# Patient Record
(demographics unavailable — no encounter records)

---

## 2025-06-11 NOTE — HISTORY OF PRESENT ILLNESS
[Snoring] : snoring [Awakes Unrefreshed] : awakening unrefreshed [Daytime Somnolence] : daytime somnolence [FreeTextEntry1] : 37M PMHx obesity who presents for initial sleep evaluation.  Presents today with symptoms of snoring and EDS.  Reports undergoing home sleep test about 5 years ago, pre-covid, however was never able to follow up on the results. Had some weight loss 2 years ago with less severe symptoms but had gained some weight back and has been stable for the past year. He wakes about once a night. He typically goes to bed around 00:00 and wakes at 07:30-08:00 but there are times he needs to get up as early as 03:00 to work. He works as a kosher advisor. Denies smoking or etoh use. He denies any chronic medical conditions and does not take medications regularly. [Witnessed Apneas] : no witnessed sleep apnea [Frequent Nocturnal Awakening] : no nocturnal awakening [Awakes with Headache] : no headache upon awakening [Awakening With Dry Mouth] : no dry mouth upon awakening [ESS] : 13

## 2025-06-11 NOTE — ASSESSMENT
[FreeTextEntry1] : 37M PMHx obesity who presents for initial sleep evaluation.  #suspected CARLOS Symptoms include daytime somnolence (ESS 13), snoring, non restorative sleep, and nocturnal awakenings - Discussed risks of untreated CARLOS and available treatment options - will be referred for HST with watchpat for evaluation of CARLOS - RTC pending results  Follow up after HST

## 2025-06-11 NOTE — REVIEW OF SYSTEMS
[Negative] : Psychiatric [EDS: ESS=____] : daytime somnolence: ESS=[unfilled] [Snoring] : snoring [Witnessed Apneas] : no witnessed apnea

## 2025-06-11 NOTE — PHYSICAL EXAM
[General Appearance - Well Developed] : well developed [General Appearance - Well Nourished] : well nourished [General Appearance - In No Acute Distress] : no acute distress [Normal Conjunctiva] : the conjunctiva exhibited no abnormalities [Neck Appearance] : the appearance of the neck was normal [Heart Rate And Rhythm] : heart rate was normal and rhythm regular [Heart Sounds] : normal S1 and S2 [Respiration, Rhythm And Depth] : normal respiratory rhythm and effort [Auscultation Breath Sounds / Voice Sounds] : lungs were clear to auscultation bilaterally [Nail Clubbing] : no clubbing of the fingernails [Cyanosis, Localized] : no localized cyanosis [] : no rash [Skin Lesions] : no skin lesions [No Focal Deficits] : no focal deficits [II] : II [FreeTextEntry1] : erythematous uvula